# Patient Record
Sex: FEMALE
[De-identification: names, ages, dates, MRNs, and addresses within clinical notes are randomized per-mention and may not be internally consistent; named-entity substitution may affect disease eponyms.]

---

## 2022-11-24 ENCOUNTER — NURSE TRIAGE (OUTPATIENT)
Dept: OTHER | Facility: CLINIC | Age: 37
End: 2022-11-24

## 2022-11-24 NOTE — TELEPHONE ENCOUNTER
Subjective: Caller states \"I had the flu last week\"     Current Symptoms: Saw her PCP. Lymph nodes were swollen and ears are clogged up. Was dx with a double ear infection. Was put on Augmentin. She is still having a lot of pressure and pain in ears. Unable to hear out of her ears. Onset: 1 week ago; unchanged    Associated Symptoms: NA    Pain Severity: 4/10; pressure; constant, moderate    Temperature: denies fever     What has been tried: Rapid Release Tylenol    LMP: NA Pregnant: NA    Recommended disposition: See PCP within 24 Hours    Care advice provided, patient verbalizes understanding; denies any other questions or concerns; instructed to call back for any new or worsening symptoms. Patient/caller agrees to proceed to nearest [facility]     This triage is a result of a call to [address] [place] [address]. Please do not respond to the triage nurse through this encounter. Any subsequent communication should be directly with the patient.     Reason for Disposition   White, yellow, or green discharge    Protocols used: Earache-ADULT-